# Patient Record
Sex: FEMALE
[De-identification: names, ages, dates, MRNs, and addresses within clinical notes are randomized per-mention and may not be internally consistent; named-entity substitution may affect disease eponyms.]

---

## 2017-06-02 ENCOUNTER — HOSPITAL ENCOUNTER (EMERGENCY)
Dept: HOSPITAL 5 - ED | Age: 32
Discharge: HOME | End: 2017-06-02
Payer: MEDICAID

## 2017-06-02 VITALS — SYSTOLIC BLOOD PRESSURE: 119 MMHG | DIASTOLIC BLOOD PRESSURE: 75 MMHG

## 2017-06-02 DIAGNOSIS — F17.200: ICD-10-CM

## 2017-06-02 DIAGNOSIS — M67.40: Primary | ICD-10-CM

## 2017-06-02 NOTE — XRAY REPORT
LEFT FOREARM:



History: Left forearm pain, mass.



AP and lateral views of the forearm demonstrate normal

mineralization and contours for this patient's age.  No destructive

changes are noted and the adjacent soft tissues are normal.



IMPRESSION:

Normal left forearm. No soft tissue mass is appreciated on x-ray.

## 2021-03-16 ENCOUNTER — DASHBOARD ENCOUNTERS (OUTPATIENT)
Age: 36
End: 2021-03-16

## 2021-03-19 ENCOUNTER — OFFICE VISIT (OUTPATIENT)
Dept: URBAN - METROPOLITAN AREA CLINIC 70 | Facility: CLINIC | Age: 36
End: 2021-03-19

## 2021-03-19 RX ORDER — IBUPROFEN 800 MG/1
TABLET ORAL
Qty: 0 | Refills: 0 | Status: ACTIVE | COMMUNITY
Start: 1900-01-01

## 2021-03-19 RX ORDER — HYDROCODONE BITARTRATE AND ACETAMINOPHEN 10; 325 MG/1; MG/1
TABLET ORAL
Qty: 0 | Refills: 0 | Status: ACTIVE | COMMUNITY
Start: 1900-01-01